# Patient Record
(demographics unavailable — no encounter records)

---

## 2025-01-29 NOTE — PHYSICAL EXAM
[No Acute Distress] : no acute distress [Well Nourished] : well nourished [Well Developed] : well developed [Well-Appearing] : well-appearing [Normal Voice/Communication] : normal voice/communication [No JVD] : no jugular venous distention [No Lymphadenopathy] : no lymphadenopathy [Supple] : supple [No Respiratory Distress] : no respiratory distress  [No Accessory Muscle Use] : no accessory muscle use [Clear to Auscultation] : lungs were clear to auscultation bilaterally [Normal Rate] : normal rate  [Regular Rhythm] : with a regular rhythm [Normal S1, S2] : normal S1 and S2 [No Edema] : there was no peripheral edema [Soft] : abdomen soft [Non Tender] : non-tender [Non-distended] : non-distended [Grossly Normal Strength/Tone] : grossly normal strength/tone [Coordination Grossly Intact] : coordination grossly intact [No Focal Deficits] : no focal deficits [Normal Gait] : normal gait [Speech Grossly Normal] : speech grossly normal [Normal Affect] : the affect was normal [Alert and Oriented x3] : oriented to person, place, and time [Normal Mood] : the mood was normal [Normal Insight/Judgement] : insight and judgment were intact [de-identified] : pnd, no exudate, erythema  of nasal mucosa, TM mild post ear effusion, no erythema of tm

## 2025-01-29 NOTE — HISTORY OF PRESENT ILLNESS
[FreeTextEntry8] : persistent cough states 1 month persisting productive cough , no fever, no sorethroat , no eat pain  states overall feeling better but cough remaining

## 2025-01-29 NOTE — ASSESSMENT
[FreeTextEntry1] : followup xray  albuterol prn  advair if not resolved  flonase  followup labs  call back office if not resolved

## 2025-04-07 NOTE — HISTORY OF PRESENT ILLNESS
[FreeTextEntry1] : pt here for follow up [de-identified] : admits to taking metformin only once a day and taking 20g statin not higher dose  no chest pain, no sob, no cough, no fever, no dizziness, no abdominal pain, no n/v/d/c/melena/brbpr/hematuria/dysuria

## 2025-04-07 NOTE — PHYSICAL EXAM
[No Acute Distress] : no acute distress [Well Nourished] : well nourished [Well Developed] : well developed [Well-Appearing] : well-appearing [Normal Voice/Communication] : normal voice/communication [No JVD] : no jugular venous distention [No Respiratory Distress] : no respiratory distress  [No Accessory Muscle Use] : no accessory muscle use [Clear to Auscultation] : lungs were clear to auscultation bilaterally [Normal Rate] : normal rate  [Regular Rhythm] : with a regular rhythm [Normal S1, S2] : normal S1 and S2 [No Edema] : there was no peripheral edema [Soft] : abdomen soft [Non Tender] : non-tender [Non-distended] : non-distended [Grossly Normal Strength/Tone] : grossly normal strength/tone [Coordination Grossly Intact] : coordination grossly intact [No Focal Deficits] : no focal deficits [Normal Gait] : normal gait [Speech Grossly Normal] : speech grossly normal [Normal Affect] : the affect was normal [Alert and Oriented x3] : oriented to person, place, and time [Normal Mood] : the mood was normal [Normal Insight/Judgement] : insight and judgment were intact